# Patient Record
Sex: MALE | Race: WHITE | NOT HISPANIC OR LATINO
[De-identification: names, ages, dates, MRNs, and addresses within clinical notes are randomized per-mention and may not be internally consistent; named-entity substitution may affect disease eponyms.]

---
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
## 2020-06-24 ENCOUNTER — RX ONLY (OUTPATIENT)
Age: 20
Setting detail: RX ONLY
End: 2020-06-24

## 2020-06-24 RX ORDER — CLINDAMYCIN PHOSPHATE AND BENZOYL PEROXIDE 10; 37.5 MG/G; MG/G
GEL TOPICAL QD
Qty: 1 | Refills: 4 | Status: ERX

## 2020-09-04 ENCOUNTER — APPOINTMENT (OUTPATIENT)
Dept: GENERAL RADIOLOGY | Age: 20
End: 2020-09-04
Attending: PEDIATRICS
Payer: COMMERCIAL

## 2020-09-04 ENCOUNTER — HOSPITAL ENCOUNTER (EMERGENCY)
Age: 20
Discharge: HOME | End: 2020-09-04
Attending: PEDIATRICS
Payer: COMMERCIAL

## 2020-09-04 VITALS
WEIGHT: 210.54 LBS | HEART RATE: 84 BPM | DIASTOLIC BLOOD PRESSURE: 74 MMHG | TEMPERATURE: 97.8 F | SYSTOLIC BLOOD PRESSURE: 137 MMHG | RESPIRATION RATE: 16 BRPM | OXYGEN SATURATION: 98 %

## 2020-09-04 DIAGNOSIS — M79.671 RIGHT FOOT PAIN: ICD-10-CM

## 2020-09-04 DIAGNOSIS — M25.571 ACUTE RIGHT ANKLE PAIN: Primary | ICD-10-CM

## 2020-09-04 PROCEDURE — 99284 EMERGENCY DEPT VISIT MOD MDM: CPT

## 2020-09-04 PROCEDURE — 73630 X-RAY EXAM OF FOOT: CPT

## 2020-09-04 PROCEDURE — 73610 X-RAY EXAM OF ANKLE: CPT

## 2020-09-04 PROCEDURE — 74011250637 HC RX REV CODE- 250/637: Performed by: PEDIATRICS

## 2020-09-04 RX ORDER — IBUPROFEN 600 MG/1
600 TABLET ORAL
Status: COMPLETED | OUTPATIENT
Start: 2020-09-04 | End: 2020-09-04

## 2020-09-04 RX ADMIN — IBUPROFEN 600 MG: 600 TABLET, FILM COATED ORAL at 14:34

## 2020-09-04 NOTE — ED NOTES
Pt discharged home ambulatory from dept. Pt acting age appropriately and respirations regular and unlabored. No further complaints at this time. Patient verbalized understanding of discharge paperwork and has no further questions at this time. Education provided about continuation of care, follow up care with ortho/ PCP and medication administration. Patient able to provide teach back about discharge instructions.

## 2020-09-04 NOTE — ED NOTES
Pt medicated with additional motrin to make up for appropriate dose and tolerated well. Education provided regarding medication administration and usage. Pt verbalized understanding.

## 2020-09-04 NOTE — ED PROVIDER NOTES
HPI      Please note that this dictation was completed with Dragon, computer voice recognition software. Quite often unanticipated grammatical, syntax, homophones, and other interpretive errors are inadvertently transcribed by the computer software. Please disregard these errors. Additionally, please excuse any errors that have escaped final proofreading. History of present illness:    Patient is a 25-year-old male previously well presents to the emergency department with complaints of right foot pain. He states he was in his usual state of good health walking on the driveway last evening when he stepped off towards the lawn on a depression in the driveway and twisted his foot. He states he immediately felt pain had difficulty ambulating but was able to walk. Patient states this morning he has been and unable to bear weight with increasing pain and swelling. He has taken 2 mg of ibuprofen approximately 3 hours prior to arrival.  No head injury no loss of consciousness no neck pain no chest pain no trouble breathing no abdominal pain no numbness or weakness. No other complaints no modifying factors no other concerns. Patient is uncertain whether he everted or inverted his ankle upon falling. Review of systems: A 10 point review was conducted. All pertinent positive and negatives are as stated in the HPI  Allergies: None  Medications: None  Immunizations: Up-to-date  Past medical history: Unremarkable  Family history: Noncontributory to this visit  Social history: Lives with family. Denies smoking or drug use  History reviewed. No pertinent past medical history. Past Surgical History:   Procedure Laterality Date    HX OTHER SURGICAL      Cholecystectomy-2017         History reviewed. No pertinent family history.     Social History     Socioeconomic History    Marital status: SINGLE     Spouse name: Not on file    Number of children: Not on file    Years of education: Not on file    Highest education level: Not on file   Occupational History    Not on file   Social Needs    Financial resource strain: Not on file    Food insecurity     Worry: Not on file     Inability: Not on file    Transportation needs     Medical: Not on file     Non-medical: Not on file   Tobacco Use    Smoking status: Never Smoker    Smokeless tobacco: Never Used   Substance and Sexual Activity    Alcohol use: Yes     Comment: socially    Drug use: Never    Sexual activity: Not on file   Lifestyle    Physical activity     Days per week: Not on file     Minutes per session: Not on file    Stress: Not on file   Relationships    Social connections     Talks on phone: Not on file     Gets together: Not on file     Attends Christianity service: Not on file     Active member of club or organization: Not on file     Attends meetings of clubs or organizations: Not on file     Relationship status: Not on file    Intimate partner violence     Fear of current or ex partner: Not on file     Emotionally abused: Not on file     Physically abused: Not on file     Forced sexual activity: Not on file   Other Topics Concern    Not on file   Social History Narrative    Not on file         ALLERGIES: Patient has no known allergies. Review of Systems   Constitutional: Negative for activity change, appetite change and fever. HENT: Negative for ear pain and sore throat. Eyes: Negative for pain and visual disturbance. Respiratory: Negative for cough, shortness of breath and wheezing. Cardiovascular: Negative for chest pain. Gastrointestinal: Negative for abdominal pain, diarrhea and vomiting. Genitourinary: Negative for decreased urine volume and difficulty urinating. Musculoskeletal: Positive for arthralgias and gait problem. Negative for neck pain. Skin: Negative for rash. All other systems reviewed and are negative.       Vitals:    09/04/20 1347 09/04/20 1348   BP:  143/77   Pulse:  91   Resp:  18   Temp: 97.8 °F (36.6 °C)   SpO2:  96%   Weight: 95.5 kg (210 lb 8.6 oz)             Physical Exam  Vitals signs and nursing note reviewed. PE:  GEN:  WDWN male alert non toxic in NAD talkative interactive well appearing  SK: CRT < 2 sec, good distal pulses. No lesions, no rashes  HEENT: H: AT/NC. E: EOMI , PERRL, E: TM clear  N/T: Clear oropharynx  NECK: supple, no meningismus. No pain on palpation  Chest: Clear to auscultation, clear BS. NO rales, rhonchi, wheezes or distress. No Retraction. CV: Regular rate and rhythm. Normal S1 S2 . No murmur, gallops or thrills  ABD: Soft non tender, no hepatomegaly, good bowel sound, no guarding,benign  MS: FROM all extremities, no long bone tenderness. No swelling, cyanosis, no edema except right foot        Good distal pulses. Difficulty ambulating on right foot  Right foot: + STS, + redness/bruising of 3-5th MT + pain on paalp over 3-5th MT, malleloi - no STS bilaterally, good dorsalis pedis pulse and post tibil pulse, Sensation in tact, + decreased plantar flexion  NEURO: Alert. No focality. Cranial nerves 2-12 grossly intact. GCS 15  Behavior and mentation appropriate for age        MDM  Number of Diagnoses or Management Options  Acute right ankle pain:   Right foot pain:   Diagnosis management comments: Medical decision making:    Differential diagnosis includes: Strain, ankle sprain, fracture    X-ray foot: No fracture  X-ray ankle: No dislocation no fracture    Patient unable to bear weight on right foot  Aircast splint placed by nursing and crutches dispensed home on Motrin. Patient given Motrin in ER with marked improvement in symptoms    All precautions reviewed with patient. He is understanding and agreeable to plan. He will be nonweightbearing until symptoms improve and follow-up with his PCP in 4 to 5 days.   Contact information for orthopedics has been provided to him if needed    He will return to the emergency department for any worsening symptoms including any trouble breathing fevers vomiting numbness weakness discoloration swelling of foot or other new concerns    Clinical impression:  Right ankle pain  Right foot pain  Status post fall       Amount and/or Complexity of Data Reviewed  Tests in the radiology section of CPT®: ordered and reviewed  Independent visualization of images, tracings, or specimens: yes           Procedures

## 2020-09-04 NOTE — ED TRIAGE NOTES
Triage Note: Pt reports he was walking out to car last night when he stepped wrong rolling RIGHT ankle. Pt continued with pain and swelling to area.

## 2020-09-04 NOTE — DISCHARGE INSTRUCTIONS
Use crutches and air splint for comfort  May take Motrin 600 mg every 6-8 hours if needed for pain. Follow-up with your physician in 4 to 5 days for reevaluation if needed. Return to the emergency department for any worsening symptoms including any trouble breathing fevers vomiting, numbness or weakness of foot, any discoloration of foot increasing swelling or other new concerns. Contact information for orthopedics is also been provided to you for follow-up if you choose. Foot Pain: Care Instructions  Your Care Instructions  Foot injuries that cause pain and swelling are fairly common. Almost all sports or home repair projects can cause a misstep that ends up as foot pain. Normal wear and tear, especially as you get older, also can cause foot pain. Most minor foot injuries will heal on their own, and home treatment is usually all you need to do. If you have a severe injury, you may need tests and treatment. Follow-up care is a key part of your treatment and safety. Be sure to make and go to all appointments, and call your doctor if you are having problems. It's also a good idea to know your test results and keep a list of the medicines you take. How can you care for yourself at home? · Take pain medicines exactly as directed. ? If the doctor gave you a prescription medicine for pain, take it as prescribed. ? If you are not taking a prescription pain medicine, ask your doctor if you can take an over-the-counter medicine. · Rest and protect your foot. Take a break from any activity that may cause pain. · Put ice or a cold pack on your foot for 10 to 20 minutes at a time. Put a thin cloth between the ice and your skin. · Prop up the sore foot on a pillow when you ice it or anytime you sit or lie down during the next 3 days. Try to keep it above the level of your heart. This will help reduce swelling. · Your doctor may recommend that you wrap your foot with an elastic bandage.  Keep your foot wrapped for as long as your doctor advises. · If your doctor recommends crutches, use them as directed. · Wear roomy footwear. · As soon as pain and swelling end, begin gentle exercises of your foot. Your doctor can tell you which exercises will help. When should you call for help? Call 911 anytime you think you may need emergency care. For example, call if:    · Your foot turns pale, white, blue, or cold. Call your doctor now or seek immediate medical care if:    · You cannot move or stand on your foot.     · Your foot looks twisted or out of its normal position.     · Your foot is not stable when you step down.     · You have signs of infection, such as:  ? Increased pain, swelling, warmth, or redness. ? Red streaks leading from the sore area. ? Pus draining from a place on your foot. ? A fever.     · Your foot is numb or tingly. Watch closely for changes in your health, and be sure to contact your doctor if:    · You do not get better as expected.     · You have bruises from an injury that last longer than 2 weeks. Where can you learn more? Go to http://[facility].info/  Enter D999 in the search box to learn more about \"Foot Pain: Care Instructions. \"  Current as of: March 2, 2020               Content Version: 12.6  © 6987-4846 [employer]. Care instructions adapted under license by [employer] (which disclaims liability or warranty for this information). If you have questions about a medical condition or this instruction, always ask your healthcare professional. [place] [address] any warranty or liability for your use of this information. Patient Education        Ankle Sprain: Care Instructions  Your Care Instructions     An ankle sprain can happen when you twist your ankle. The ligaments that support the ankle can get stretched and torn. Often the ankle is swollen and painful.   Ankle sprains may take from several weeks to several months to heal. Usually, the more pain and swelling you have, the more severe your ankle sprain is and the longer it will take to heal. You can heal faster and regain strength in your ankle with good home treatment. It is very important to give your ankle time to heal completely, so that you do not easily hurt your ankle again. Follow-up care is a key part of your treatment and safety. Be sure to make and go to all appointments, and call your doctor if you are having problems. It's also a good idea to know your test results and keep a list of the medicines you take. How can you care for yourself at home? · Prop up your foot on pillows as much as possible for the next 3 days. Try to keep your ankle above the level of your heart. This will help reduce the swelling. · Follow your doctor's directions for wearing a splint or elastic bandage. Wrapping the ankle may help reduce or prevent swelling. · Your doctor may give you a splint, a brace, an air stirrup, or another form of ankle support to protect your ankle until it is healed. Wear it as directed while your ankle is healing. Do not remove it unless your doctor tells you to. After your ankle has healed, ask your doctor whether you should wear the brace when you exercise. · Put ice or cold packs on your injured ankle for 10 to 20 minutes at a time. Try to do this every 1 to 2 hours for the next 3 days (when you are awake) or until the swelling goes down. Put a thin cloth between the ice and your skin. · You may need to use crutches until you can walk without pain. If you do use crutches, try to bear some weight on your injured ankle if you can do so without pain. This helps the ankle heal.  · Take pain medicines exactly as directed. ? If the doctor gave you a prescription medicine for pain, take it as prescribed. ? If you are not taking a prescription pain medicine, ask your doctor if you can take an over-the-counter medicine.   · If you have been given ankle exercises to do at home, do them exactly as instructed. These can promote healing and help prevent lasting weakness. When should you call for help? Call your doctor now or seek immediate medical care if:    · Your pain is getting worse.     · Your swelling is getting worse.     · Your splint feels too tight or you are unable to loosen it. Watch closely for changes in your health, and be sure to contact your doctor if:    · You are not getting better after 1 week. Where can you learn more? Go to http://www.gray.com/  Enter B650 in the search box to learn more about \"Ankle Sprain: Care Instructions. \"  Current as of: March 2, 2020               Content Version: 12.6  © 1604-0540 [employer]. Care instructions adapted under license by [employer] (which disclaims liability or warranty for this information). If you have questions about a medical condition or this instruction, always ask your healthcare professional. [place] [address] any warranty or liability for your use of this information. We hope that we have addressed all of your medical concerns. The examination and treatment you received in the Emergency Department were for an emergent problem and were not intended as complete care. It is important that you follow up with your healthcare provider(s) for ongoing care. If your symptoms worsen or do not improve as expected, and you are unable to reach your usual health care provider(s), you should return to the Emergency Department. Today's healthcare is undergoing tremendous change, and patient satisfaction surveys are one of the many tools to assess the quality of medical care. You may receive a survey from the [employer] organization regarding your experience in the Emergency Department. I hope that your experience has been completely positive, particularly the medical care that I provided.   As such, please participate in the survey; anything less than excellent does not meet my expectations or intentions. [facility] and [facility] participate in nationally recognized quality of care measures. If your blood pressure is greater than 120/80, as reported below, we urge that you seek medical care to address the potential of high blood pressure, commonly known as hypertension. Hypertension can be hereditary or can be caused by certain medical conditions, pain, stress, or \"white coat syndrome. \"       Please make an appointment with your health care provider(s) for follow up of your Emergency Department visit. VITALS:   Patient Vitals for the past 8 hrs:   Temp Pulse Resp BP SpO2   09/04/20 1348 97.8 °F (36.6 °C) 91 18 143/77 96 %          Thank you for allowing us to provide you with medical care today. We realize that you have many choices for your emergency care needs. Please choose us in the future for any continued health care needs. MD RG Pacheco [facility] 70: [phone]            No results found for this or any previous visit (from the past 24 hour(s)). Xr Ankle Rt Min 3 V    Result Date: 9/4/2020  INDICATION: Right ankle pain Exam: AP, lateral, oblique views of the right foot and ankle. FINDINGS: There is no acute fracture or dislocation. Ankle mortise is congruent. Soft tissues are normal. Bones are well-mineralized. IMPRESSION: No acute fracture or dislocation. Xr Foot Rt Min 3 V    Result Date: 9/4/2020  INDICATION: Right ankle pain Exam: AP, lateral, oblique views of the right foot and ankle. FINDINGS: There is no acute fracture or dislocation. Ankle mortise is congruent. Soft tissues are normal. Bones are well-mineralized. IMPRESSION: No acute fracture or dislocation.

## 2021-07-23 ENCOUNTER — RX ONLY (OUTPATIENT)
Age: 21
Setting detail: RX ONLY
End: 2021-07-23

## 2021-07-23 RX ORDER — CLINDAMYCIN PHOSPHATE AND BENZOYL PEROXIDE 10; 37.5 MG/G; MG/G
GEL TOPICAL QD
Qty: 1 | Refills: 4 | Status: ERX | COMMUNITY
Start: 2021-07-23

## 2025-01-22 ENCOUNTER — APPOINTMENT (OUTPATIENT)
Age: 25
Setting detail: DERMATOLOGY
End: 2025-01-22

## 2025-01-22 DIAGNOSIS — L72.11 PILAR CYST: ICD-10-CM

## 2025-01-22 PROCEDURE — 99202 OFFICE O/P NEW SF 15 MIN: CPT

## 2025-01-22 PROCEDURE — ? OBSERVATION

## 2025-01-22 PROCEDURE — ? DEFER

## 2025-01-22 ASSESSMENT — LOCATION ZONE DERM: LOCATION ZONE: SCALP

## 2025-01-22 ASSESSMENT — LOCATION DETAILED DESCRIPTION DERM: LOCATION DETAILED: LEFT CENTRAL FRONTAL SCALP

## 2025-01-22 ASSESSMENT — LOCATION SIMPLE DESCRIPTION DERM: LOCATION SIMPLE: LEFT SCALP
